# Patient Record
Sex: MALE | Race: AMERICAN INDIAN OR ALASKA NATIVE
[De-identification: names, ages, dates, MRNs, and addresses within clinical notes are randomized per-mention and may not be internally consistent; named-entity substitution may affect disease eponyms.]

---

## 2020-12-04 NOTE — OPERATIVE REPORT
PROCEDURE PERFORMED:  Colonoscopy.



INDICATIONS:  This is a 55-year-old -American gentleman with an

underlying history of diabetes mellitus type 2, hypertension, but no family

history of cancer.  Colonoscopy was done because of his age as part of colon

polyp screening.



DESCRIPTION OF PROCEDURE:  Initial rectal exam was unremarkable.  Instrument was

passed through the rectum onto the cecum, which was identified with ileocecal

valve and the appendiceal orifice.  Visualization was fair to good.  The cecum

was also visualized on the retroverted view.  No additional pathology was noted.

 Cecum, ascending colon, transverse colon, descending colon, and sigmoid

likewise showed normal mucosa.  There was no evidence of any polyps, colitis or

diverticular disease and the rectum showed some minor internal hemorrhoid on the

retroverted view.  There was no bleeding associated with the procedure.  No

complications associated with the procedure.



PLAN:  To resume home medication.  Have the patient follow up in the office in

1-2 weeks' time.



Thank you for the kind referral.



The procedure was done in the GI lab with assistance of the GI lab team, which

included RN, Yessica Schultz; Bigg sahni and with assistance of Anesthesia.





DD: 12/04/2020 12:13

DT: 12/04/2020 16:50

JOB# [id number]  [id number]

XAVIER/LIEN

## 2020-12-04 NOTE — ANESTHESIA CONSULTATION
Anesthesia Consult and Med Hx


Date of service: 12/04/20





- Airway


Anesthetic Teeth Evaluation: Good


ROM Head & Neck: Adequate


Mental/Hyoid Distance: Adequate


Mallampati Class: Class II


Intubation Access Assessment: Probably Good





- Pre-Operative Health Status


ASA Pre-Surgery Classification: ASA2


Proposed Anesthetic Plan: MAC





- Cardiovascular System


Hx Hypertension: Yes


Hx Coronary Artery Disease: No (high cholesterol)





- Endocrine


Hx Non-Insulin Dependent Diabetes: Yes (diet controlled)

## 2022-05-12 ENCOUNTER — HOSPITAL ENCOUNTER (OUTPATIENT)
Dept: HOSPITAL 5 - LAB | Age: 57
Discharge: HOME | End: 2022-05-12
Attending: INTERNAL MEDICINE
Payer: COMMERCIAL

## 2022-05-12 DIAGNOSIS — R94.5: Primary | ICD-10-CM

## 2022-05-12 LAB
ALBUMIN SERPL-MCNC: 4.4 G/DL (ref 3.9–5)
ALT SERPL-CCNC: 14 UNITS/L (ref 7–56)
BILIRUB DIRECT SERPL-MCNC: < 0.2 MG/DL (ref 0–0.2)

## 2022-05-12 PROCEDURE — 36415 COLL VENOUS BLD VENIPUNCTURE: CPT

## 2022-05-12 PROCEDURE — 80076 HEPATIC FUNCTION PANEL: CPT

## 2022-08-10 ENCOUNTER — HOSPITAL ENCOUNTER (OUTPATIENT)
Dept: HOSPITAL 5 - LABHHL | Age: 57
Discharge: HOME | End: 2022-08-10
Attending: INTERNAL MEDICINE
Payer: COMMERCIAL

## 2022-08-10 DIAGNOSIS — E78.5: ICD-10-CM

## 2022-08-10 DIAGNOSIS — E11.65: Primary | ICD-10-CM

## 2022-08-10 LAB — HDLC SERPL-MCNC: 42 MG/DL (ref 40–59)

## 2022-08-10 PROCEDURE — 36415 COLL VENOUS BLD VENIPUNCTURE: CPT

## 2022-08-10 PROCEDURE — 83036 HEMOGLOBIN GLYCOSYLATED A1C: CPT

## 2022-08-10 PROCEDURE — 80061 LIPID PANEL: CPT
